# Patient Record
Sex: FEMALE | Race: WHITE
[De-identification: names, ages, dates, MRNs, and addresses within clinical notes are randomized per-mention and may not be internally consistent; named-entity substitution may affect disease eponyms.]

---

## 2020-12-09 ENCOUNTER — HOSPITAL ENCOUNTER (INPATIENT)
Dept: HOSPITAL 26 - MED | Age: 85
LOS: 3 days | Discharge: HOME | DRG: 689 | End: 2020-12-12
Attending: FAMILY MEDICINE | Admitting: FAMILY MEDICINE
Payer: COMMERCIAL

## 2020-12-09 VITALS
BODY MASS INDEX: 22.49 KG/M2 | HEIGHT: 65 IN | DIASTOLIC BLOOD PRESSURE: 70 MMHG | WEIGHT: 135 LBS | SYSTOLIC BLOOD PRESSURE: 102 MMHG

## 2020-12-09 DIAGNOSIS — Z88.2: ICD-10-CM

## 2020-12-09 DIAGNOSIS — E86.0: ICD-10-CM

## 2020-12-09 DIAGNOSIS — N18.30: ICD-10-CM

## 2020-12-09 DIAGNOSIS — Z88.0: ICD-10-CM

## 2020-12-09 DIAGNOSIS — E44.0: ICD-10-CM

## 2020-12-09 DIAGNOSIS — N17.0: ICD-10-CM

## 2020-12-09 DIAGNOSIS — G93.41: ICD-10-CM

## 2020-12-09 DIAGNOSIS — E87.2: ICD-10-CM

## 2020-12-09 DIAGNOSIS — Z91.040: ICD-10-CM

## 2020-12-09 DIAGNOSIS — E78.5: ICD-10-CM

## 2020-12-09 DIAGNOSIS — E87.0: ICD-10-CM

## 2020-12-09 DIAGNOSIS — M06.9: ICD-10-CM

## 2020-12-09 DIAGNOSIS — Z91.018: ICD-10-CM

## 2020-12-09 DIAGNOSIS — I12.9: ICD-10-CM

## 2020-12-09 DIAGNOSIS — N39.0: Primary | ICD-10-CM

## 2020-12-09 DIAGNOSIS — F03.90: ICD-10-CM

## 2020-12-09 LAB
ALBUMIN FLD-MCNC: 3 G/DL (ref 3.4–5)
AMORPH SED URNS QL MICRO: (no result) /HPF
ANION GAP SERPL CALCULATED.3IONS-SCNC: 16.7 MMOL/L (ref 8–16)
APPEARANCE UR: (no result)
AST SERPL-CCNC: 59 U/L (ref 15–37)
BASOPHILS # BLD AUTO: 0 K/UL (ref 0–0.22)
BASOPHILS NFR BLD AUTO: 0.6 % (ref 0–2)
BILIRUB SERPL-MCNC: 2.3 MG/DL (ref 0–1)
BILIRUB UR QL STRIP: (no result)
BUN SERPL-MCNC: 61 MG/DL (ref 7–18)
CHLORIDE SERPL-SCNC: 101 MMOL/L (ref 98–107)
CO2 SERPL-SCNC: 22.4 MMOL/L (ref 21–32)
COLOR UR: (no result)
CREAT SERPL-MCNC: 1.9 MG/DL (ref 0.6–1.3)
EOSINOPHIL # BLD AUTO: 0.4 K/UL (ref 0–0.4)
EOSINOPHIL NFR BLD AUTO: 5.2 % (ref 0–4)
ERYTHROCYTE [DISTWIDTH] IN BLOOD BY AUTOMATED COUNT: 13.7 % (ref 11.6–13.7)
FINE GRAN CASTS URNS QL MICRO: (no result) /LPF
GFR SERPL CREATININE-BSD FRML MDRD: (no result) ML/MIN (ref 90–?)
GLUCOSE SERPL-MCNC: 141 MG/DL (ref 74–106)
GLUCOSE UR STRIP-MCNC: NEGATIVE MG/DL
HCT VFR BLD AUTO: 44.7 % (ref 36–48)
HGB BLD-MCNC: 15.2 G/DL (ref 12–16)
HGB UR QL STRIP: (no result)
LEUKOCYTE ESTERASE UR QL STRIP: NEGATIVE
LIPASE SERPL-CCNC: 346 U/L (ref 73–393)
LYMPHOCYTES # BLD AUTO: 0.3 K/UL (ref 2.5–16.5)
LYMPHOCYTES NFR BLD AUTO: 4.6 % (ref 20.5–51.1)
MCH RBC QN AUTO: 34 PG (ref 27–31)
MCHC RBC AUTO-ENTMCNC: 34 G/DL (ref 33–37)
MCV RBC AUTO: 99.3 FL (ref 80–94)
MONOCYTES # BLD AUTO: 0.3 K/UL (ref 0.8–1)
MONOCYTES NFR BLD AUTO: 4.2 % (ref 1.7–9.3)
NEUTROPHILS # BLD AUTO: 6 K/UL (ref 1.8–7.7)
NEUTROPHILS NFR BLD AUTO: 85.4 % (ref 42.2–75.2)
NITRITE UR QL STRIP: NEGATIVE
PH UR STRIP: 6 [PH] (ref 5–9)
PLATELET # BLD AUTO: 88 K/UL (ref 140–450)
POTASSIUM SERPL-SCNC: 4.1 MMOL/L (ref 3.5–5.1)
PROTHROMBIN TIME: 10.1 SECS (ref 10.8–13.4)
RBC # BLD AUTO: 4.5 MIL/UL (ref 4.2–5.4)
RBC #/AREA URNS HPF: (no result) /HPF (ref 0–5)
SODIUM SERPL-SCNC: 136 MMOL/L (ref 136–145)
WBC # BLD AUTO: 7 K/UL (ref 4.8–10.8)
WBC,URINE: (no result) /HPF (ref 0–5)

## 2020-12-09 RX ADMIN — SODIUM CHLORIDE SCH MLS/HR: 9 INJECTION, SOLUTION INTRAVENOUS at 21:40

## 2020-12-09 NOTE — NUR
PT LAYING IN BED IN NO ACUTE DISTRESS NOTED IVF OF 0.9% NS RUNNING AT THIS 
TIME. BED LOCKED AND IN LOWEST POSITION. ON CARDIAC MONITORING, BP MONITORING 
AND PULSE OXIMETRY.

## 2020-12-09 NOTE — NUR
RECEIVED CALL FROM SAF IN LAB WITH CRITICAL RESULTS:

LACTIC ACID 2.1 AND BUN 61 DR STEWART NOTIFIED, NO NEW ORDERS RECEIVED

## 2020-12-10 VITALS — SYSTOLIC BLOOD PRESSURE: 107 MMHG | DIASTOLIC BLOOD PRESSURE: 66 MMHG

## 2020-12-10 VITALS — SYSTOLIC BLOOD PRESSURE: 148 MMHG | DIASTOLIC BLOOD PRESSURE: 80 MMHG

## 2020-12-10 VITALS — SYSTOLIC BLOOD PRESSURE: 148 MMHG | DIASTOLIC BLOOD PRESSURE: 79 MMHG

## 2020-12-10 VITALS — SYSTOLIC BLOOD PRESSURE: 153 MMHG | DIASTOLIC BLOOD PRESSURE: 64 MMHG

## 2020-12-10 VITALS — DIASTOLIC BLOOD PRESSURE: 70 MMHG | SYSTOLIC BLOOD PRESSURE: 115 MMHG

## 2020-12-10 VITALS — DIASTOLIC BLOOD PRESSURE: 73 MMHG | SYSTOLIC BLOOD PRESSURE: 145 MMHG

## 2020-12-10 LAB
AMYLASE SERPL-CCNC: 60 U/L (ref 25–115)
ANION GAP SERPL CALCULATED.3IONS-SCNC: 13.3 MMOL/L (ref 8–16)
BASOPHILS # BLD AUTO: 0 K/UL (ref 0–0.22)
BASOPHILS NFR BLD AUTO: 0.3 % (ref 0–2)
BUN SERPL-MCNC: 45 MG/DL (ref 7–18)
CHLORIDE SERPL-SCNC: 108 MMOL/L (ref 98–107)
CHOLEST/HDLC SERPL: 6.7 {RATIO} (ref 1–4.5)
CO2 SERPL-SCNC: 23.6 MMOL/L (ref 21–32)
CREAT SERPL-MCNC: 1.4 MG/DL (ref 0.6–1.3)
EOSINOPHIL # BLD AUTO: 0.5 K/UL (ref 0–0.4)
EOSINOPHIL NFR BLD AUTO: 7.8 % (ref 0–4)
ERYTHROCYTE [DISTWIDTH] IN BLOOD BY AUTOMATED COUNT: 13.9 % (ref 11.6–13.7)
GFR SERPL CREATININE-BSD FRML MDRD: (no result) ML/MIN (ref 90–?)
GLUCOSE SERPL-MCNC: 95 MG/DL (ref 74–106)
HCT VFR BLD AUTO: 40.2 % (ref 36–48)
HDLC SERPL-MCNC: 40 MG/DL (ref 40–60)
HGB BLD-MCNC: 13.7 G/DL (ref 12–16)
LDLC SERPL CALC-MCNC: 167 MG/DL (ref 60–100)
LIPASE SERPL-CCNC: 352 U/L (ref 73–393)
LYMPHOCYTES # BLD AUTO: 0.6 K/UL (ref 2.5–16.5)
LYMPHOCYTES NFR BLD AUTO: 8.4 % (ref 20.5–51.1)
MAGNESIUM SERPL-MCNC: 2.2 MG/DL (ref 1.8–2.4)
MCH RBC QN AUTO: 34 PG (ref 27–31)
MCHC RBC AUTO-ENTMCNC: 34 G/DL (ref 33–37)
MCV RBC AUTO: 100.1 FL (ref 80–94)
MONOCYTES # BLD AUTO: 0.4 K/UL (ref 0.8–1)
MONOCYTES NFR BLD AUTO: 6.6 % (ref 1.7–9.3)
NEUTROPHILS # BLD AUTO: 5.1 K/UL (ref 1.8–7.7)
NEUTROPHILS NFR BLD AUTO: 76.9 % (ref 42.2–75.2)
PHOSPHATE SERPL-MCNC: 3.3 MG/DL (ref 2.5–4.9)
PLATELET # BLD AUTO: 89 K/UL (ref 140–450)
POTASSIUM SERPL-SCNC: 3.9 MMOL/L (ref 3.5–5.1)
RBC # BLD AUTO: 4.02 MIL/UL (ref 4.2–5.4)
SODIUM SERPL-SCNC: 141 MMOL/L (ref 136–145)
T4 FREE SERPL-MCNC: 1.12 NG/DL (ref 0.76–1.46)
TRIGL SERPL-MCNC: 305 MG/DL (ref 30–150)
TSH SERPL DL<=0.05 MIU/L-ACNC: 2.88 UIU/ML (ref 0.34–3.74)
WBC # BLD AUTO: 6.6 K/UL (ref 4.8–10.8)

## 2020-12-10 RX ADMIN — CYCLOBENZAPRINE HYDROCHLORIDE SCH MG: 10 TABLET, FILM COATED ORAL at 20:27

## 2020-12-10 RX ADMIN — DONEPEZIL HYDROCHLORIDE SCH MG: 10 TABLET, FILM COATED ORAL at 20:27

## 2020-12-10 RX ADMIN — SODIUM CHLORIDE SCH MLS/HR: 9 INJECTION, SOLUTION INTRAVENOUS at 15:16

## 2020-12-10 RX ADMIN — PANTOPRAZOLE SODIUM SCH MG: 40 TABLET, DELAYED RELEASE ORAL at 08:54

## 2020-12-10 RX ADMIN — PREGABALIN SCH MG: 25 CAPSULE ORAL at 20:27

## 2020-12-10 NOTE — NUR
PT CURRENTLY RECEIVING HEMODIALYSIS. BS AT THIS TIME 129 NO COVERAGE NEEDED. HEPARIN 10,000 
GIVEN TO HEMODIALYSIS NURSE FOR ADMINISTRATION/FLUSH AFTER DIALYSES.  NO SEIZURE LIKE 
ACTIVITY  

-------------------------------------------------------------------------------

Addendum: 12/10/20 at 1257 by Yeny Bedolla RN

-------------------------------------------------------------------------------

WRONG PT DISREGARD PT IS RESTING IN BED IN NO DISTRESS. CALL LIGHT WITHIN REACH. ALL NEEDS 
MET.

## 2020-12-10 NOTE — NUR
PT LAYING IN BED WITH HOB SLIGHTLY ELEVATED IN NO DISTRESS. PT NEEDS REDIRECTION FROM 
REMOVING LEADS HOWEVER REDIRECTABLE. REMAINS ALTERED CAN COMMUNICATE WANTS AND NEEDS. 
CONTINUES TO RECEIVE IVF.

## 2020-12-10 NOTE — NUR
PATIENT HAS BEEN SCREENED AND CATEGORIZED AS HIGH NUTRITION RISK. PATIENT WILL BE SEEN 
WITHIN 1-2 DAYS OF ADMISSION.



12/10/20-12/11/20



NALINI CAVAZOS RD

## 2020-12-10 NOTE — NUR
RECEIVED BEDSIDE ENDORSEMENT FROM AM SHIFT RN. PT IS AWAKE, ALERT, LYING IN BED, FOWLERS 
POSITION, WATCHING TV, NO DISTRESS, DENIES PAIN, IVF INFUSING, FALL PROTOCOL IN PLACE, PLAN 
OF CARE DISCUSSED, CALL LIGHT WITHIN REACH.

## 2020-12-10 NOTE — NUR
PT IS AWAKE, WATCHING TV. HOB ELEVATED, DUE MEDS GIVEN AS ORDERED, TOLERATED WELL, CALL 
LIGHT WITHIN REACH.

## 2020-12-10 NOTE — NUR
PT ARRIVED TO UNIT FROM ED VIA GURNEY. RECEIVED REPORT PRIOR. PT AOX2-3, ABLE TO MAKE NEEDS 
KNOWN. CALM AND COOPERATIVE TO CARE. PT WITH MOMENTS OF CONFUSION. RESPIRATIONS ARE EVEN AND 
UNLABORED TO ROOM AIR. ABDOMEN IS SOFT AND NON-TENDER, ACTIVE BOWEL SOUNDS NOTED. SKIN IS 
WARM, DRY, AND INTACT. PT WITH IV ACCESS ON LEFT FA G22 PATENT AND INTACT, IVF INFUSING 
WELL. PT NOT IN DISTRESS, DENIES ANY PAIN OR DISCOMFORT. NO REQUESTS MADE. PT WELCOMED AND 
ORIENTED TO ROOM. VS TAKEN, STABLE. MRSA SWAB COLLECTED, PT HOOKED TO TELE MONITOR. PT KEPT 
COMFORTABLE. SAFETY MEASURES IN PLACE. CALL LIGHT WITHIN REACH. WILL CONTINUE TO MONITOR.

## 2020-12-10 NOTE — NUR
Patient will be admitted to care of Dr. Sinclair . Admited to Telemetry.  Will go to 
room 108A. Belongings list completed.  Report to Terrell SMITH.

## 2020-12-10 NOTE — NUR
ROUNDS MADE. PT IN BED RESTING WITH HOB ELEVATED. PT NOT IN DISTRESS. DENIES ANY PAIN OR 
DISCOMFORT. NO REQUESTS MADE. PT KEPT COMFORTABLE. SAFETY MEASURES IN PLACE. CALL LIGHT 
WITHIN REACH. WILL CONTINUE TO MONITOR.

## 2020-12-10 NOTE — NUR
PT IS AWAKE AND ORIENTED X 2 TO PERSON, PLACE. FRIENDLY RECEIVING IVF TO LEFT FOREARM. LUNG 
SOUNDS CLEAR ABD IS ROUND, SOFT AND NONTENDER ACTIVE BS X 4. TOLERATED PO MEDICATION. 
REMAINS ALTERED. PT WAS RE-ORIENTED, SAFETY MEASURES IN PLACE. WILL CONTINUE WITH POC.

## 2020-12-10 NOTE — NUR
PT IS RESTING IN BED, SOME MOMENTS OF HALLUCINATIONS VERBALIZING SHE SEES BLOOD ON THE 
FLOOR, REFERRING TO DISCOLORS ON THE WALL.  REPORTING SEEING FAMILY PRESENT. PT WAS 
RE-ORIENTED AND REASSURED FOR SAFETY. BED ALARM ACTIVATED SAFETY MEASURES IN PLACE.

## 2020-12-10 NOTE — NUR
VS STABLE. PT IN BED RESTING. DENIES ANY PAIN OR DISCOMFORT. NO S/SX OF DISTRESS NOTED. NO 
REQUESTS MADE. PT KEPT COMFORTABLE. WILL CONTINUE TO MONITOR.

## 2020-12-10 NOTE — NUR
12/10/20 RD INITIAL ASSESSMENT COMPLETED



PLEASE REFER TO NUTRITION ASSESSMENT UNDER CARE ACTIVITY FOR ESTIMATED NUTRITIONAL NEEDS. 



1. CONTINUE PUREE DIET AS TOLERATED 

2. RECOMMEND ENSURE TID

3. ENCOURAGE PO INTAKE ABOVE 50%

4. RD TO FOLLOW-UP 2-3 DAYS, HIGH RISK 



NALINI CAVAZOS, RD

## 2020-12-11 VITALS — SYSTOLIC BLOOD PRESSURE: 153 MMHG | DIASTOLIC BLOOD PRESSURE: 65 MMHG

## 2020-12-11 VITALS — SYSTOLIC BLOOD PRESSURE: 155 MMHG | DIASTOLIC BLOOD PRESSURE: 65 MMHG

## 2020-12-11 VITALS — DIASTOLIC BLOOD PRESSURE: 60 MMHG | SYSTOLIC BLOOD PRESSURE: 155 MMHG

## 2020-12-11 VITALS — SYSTOLIC BLOOD PRESSURE: 144 MMHG | DIASTOLIC BLOOD PRESSURE: 67 MMHG

## 2020-12-11 VITALS — SYSTOLIC BLOOD PRESSURE: 135 MMHG | DIASTOLIC BLOOD PRESSURE: 98 MMHG

## 2020-12-11 VITALS — DIASTOLIC BLOOD PRESSURE: 63 MMHG | SYSTOLIC BLOOD PRESSURE: 153 MMHG

## 2020-12-11 VITALS — SYSTOLIC BLOOD PRESSURE: 128 MMHG | DIASTOLIC BLOOD PRESSURE: 70 MMHG

## 2020-12-11 VITALS — SYSTOLIC BLOOD PRESSURE: 166 MMHG | DIASTOLIC BLOOD PRESSURE: 84 MMHG

## 2020-12-11 LAB
ANION GAP SERPL CALCULATED.3IONS-SCNC: 16 MMOL/L (ref 8–16)
BASOPHILS # BLD AUTO: 0.1 K/UL (ref 0–0.22)
BASOPHILS NFR BLD AUTO: 0.7 % (ref 0–2)
BUN SERPL-MCNC: 23 MG/DL (ref 7–18)
CHLORIDE SERPL-SCNC: 111 MMOL/L (ref 98–107)
CO2 SERPL-SCNC: 21.1 MMOL/L (ref 21–32)
CREAT SERPL-MCNC: 1.1 MG/DL (ref 0.6–1.3)
EOSINOPHIL # BLD AUTO: 0.5 K/UL (ref 0–0.4)
EOSINOPHIL NFR BLD AUTO: 6.4 % (ref 0–4)
ERYTHROCYTE [DISTWIDTH] IN BLOOD BY AUTOMATED COUNT: 13.4 % (ref 11.6–13.7)
GFR SERPL CREATININE-BSD FRML MDRD: (no result) ML/MIN (ref 90–?)
GLUCOSE SERPL-MCNC: 97 MG/DL (ref 74–106)
HCT VFR BLD AUTO: 39.3 % (ref 36–48)
HGB BLD-MCNC: 13.4 G/DL (ref 12–16)
LYMPHOCYTES # BLD AUTO: 1.5 K/UL (ref 2.5–16.5)
LYMPHOCYTES NFR BLD AUTO: 18.9 % (ref 20.5–51.1)
MCH RBC QN AUTO: 34 PG (ref 27–31)
MCHC RBC AUTO-ENTMCNC: 34 G/DL (ref 33–37)
MCV RBC AUTO: 100.7 FL (ref 80–94)
MONOCYTES # BLD AUTO: 0.9 K/UL (ref 0.8–1)
MONOCYTES NFR BLD AUTO: 11.2 % (ref 1.7–9.3)
NEUTROPHILS # BLD AUTO: 4.9 K/UL (ref 1.8–7.7)
NEUTROPHILS NFR BLD AUTO: 62.8 % (ref 42.2–75.2)
PLATELET # BLD AUTO: 91 K/UL (ref 140–450)
POTASSIUM SERPL-SCNC: 4.1 MMOL/L (ref 3.5–5.1)
RBC # BLD AUTO: 3.9 MIL/UL (ref 4.2–5.4)
SODIUM SERPL-SCNC: 144 MMOL/L (ref 136–145)
WBC # BLD AUTO: 7.8 K/UL (ref 4.8–10.8)

## 2020-12-11 RX ADMIN — PREGABALIN SCH MG: 25 CAPSULE ORAL at 22:14

## 2020-12-11 RX ADMIN — PANTOPRAZOLE SODIUM SCH MG: 40 TABLET, DELAYED RELEASE ORAL at 08:36

## 2020-12-11 RX ADMIN — OXYBUTYNIN CHLORIDE SCH MG: 5 TABLET ORAL at 08:36

## 2020-12-11 RX ADMIN — DONEPEZIL HYDROCHLORIDE SCH MG: 10 TABLET, FILM COATED ORAL at 22:13

## 2020-12-11 RX ADMIN — SODIUM CHLORIDE SCH MLS/HR: 9 INJECTION, SOLUTION INTRAVENOUS at 00:59

## 2020-12-11 RX ADMIN — CLOPIDOGREL BISULFATE SCH MG: 75 TABLET, FILM COATED ORAL at 08:37

## 2020-12-11 RX ADMIN — PREGABALIN SCH MG: 25 CAPSULE ORAL at 08:37

## 2020-12-11 RX ADMIN — CYCLOBENZAPRINE HYDROCHLORIDE SCH MG: 10 TABLET, FILM COATED ORAL at 22:13

## 2020-12-11 RX ADMIN — SODIUM CHLORIDE SCH MLS/HR: 9 INJECTION, SOLUTION INTRAVENOUS at 23:40

## 2020-12-11 RX ADMIN — FERROUS SULFATE TAB 325 MG (65 MG ELEMENTAL FE) SCH MG: 325 (65 FE) TAB at 08:36

## 2020-12-11 NOTE — NUR
DC PLANNER:

PATIENT WILL BE DISCHARGED TODAY BACK TO [facility] TODAY. FAXED CLINICALS WILL FOLLOW 
UP FOR A BED NUMBER. 

-------------------------------------------------------------------------------

Addendum: 12/11/20 at 1346 by Odessa Garcia CM

-------------------------------------------------------------------------------

JOHNNY QUEVEDO:

PATIENT IS FROM [facility]. PATIENT CAN RETURN TO [facility] RO[facility]. 
HOWEVER THEY CAN NOT PROVIDE TRANSPORTATION.

-------------------------------------------------------------------------------

Addendum: 12/11/20 at 1504 by Odessa Garcia CM

-------------------------------------------------------------------------------

JOHNNY QUEVEDO:

TRANSPORTATION HAS BEEN SET UP WITH [facility] [phone] FOR 7:00 PM. NOTIFIED RN TEGEGENE 



[facility] 

[phone]

[facility] ROOM [address]


-------------------------------------------------------------------------------

Addendum: 12/11/20 at 1506 by Odessa Garcia CM

-------------------------------------------------------------------------------

JOHNNY QUEVEDO:

TRIED TO CONTACT PATIENTS MIKI NICHOLSON [phone] THE PHONE NUMBER PROVIDED IS 
DISCONNECTED.

## 2020-12-11 NOTE — NUR
RECIEVED PT AWAKE , CONFUSED - W/ HX OF DEMENTIA , NID - O2 SAT WNL - RA , IV SITE INTACT 
AND PATENT  PER NURSE AMADOU PT IS FOR DISCHARGE BACK TO NURSING FACILITY - WAITING THE [facility] 
TRANSPORT TO  THE PT . SAFETY MEASURES IN PLACE - BED ALARM ON . PLAN OF CARE 
DISCUSSED BUT POOR UNDERSTANDING DUE TO MENTAL STATUS - ON TELE MONITOR . WILL CONT. TO 
MONITOR . DENIES ANY PAIN .

## 2020-12-11 NOTE — NUR
RECEIVED BEDSIDE ENDORSEMENT FROM AM SHIFT RN. PT IS IN BED, FOWLERS POSITION, NO DISTRESS, 
IVF INFUSING, WE'RE WAITING FOR TRANSPORT TO ARRIVE, D/C PAPERS PRINTED OUT AND SIGNED, AM 
NURSE ALREADY GAVE REPORT TO THE NURSE IN [facility]. PT IS GOING TO ROOM 209. FALL 
PROTOCOL IN PLACE, CALL LIGHT WITHIN REACH.

## 2020-12-11 NOTE — NUR
Talked to case  management regarding discharge planning. Case management said still working 
on discharge

-------------------------------------------------------------------------------

Addendum: 12/11/20 at 1424 by Agency Antonio SMITH RN

-------------------------------------------------------------------------------

1424: Followed up with DC manager; waiting for transport

## 2020-12-12 VITALS — SYSTOLIC BLOOD PRESSURE: 133 MMHG | DIASTOLIC BLOOD PRESSURE: 82 MMHG

## 2020-12-12 VITALS — DIASTOLIC BLOOD PRESSURE: 78 MMHG | SYSTOLIC BLOOD PRESSURE: 140 MMHG

## 2020-12-12 LAB
ANION GAP SERPL CALCULATED.3IONS-SCNC: 14.2 MMOL/L (ref 8–16)
BUN SERPL-MCNC: 18 MG/DL (ref 7–18)
CHLORIDE SERPL-SCNC: 111 MMOL/L (ref 98–107)
CO2 SERPL-SCNC: 24.4 MMOL/L (ref 21–32)
CREAT SERPL-MCNC: 0.9 MG/DL (ref 0.6–1.3)
EOSINOPHIL NFR BLD MANUAL: 4 % (ref 0–4)
ERYTHROCYTE [DISTWIDTH] IN BLOOD BY AUTOMATED COUNT: 13.7 % (ref 11.6–13.7)
GFR SERPL CREATININE-BSD FRML MDRD: (no result) ML/MIN (ref 90–?)
GLUCOSE SERPL-MCNC: 97 MG/DL (ref 74–106)
HCT VFR BLD AUTO: 36.4 % (ref 36–48)
HGB BLD-MCNC: 12.4 G/DL (ref 12–16)
LYMPHOCYTES NFR BLD MANUAL: 45 % (ref 20–46)
MCH RBC QN AUTO: 35 PG (ref 27–31)
MCHC RBC AUTO-ENTMCNC: 34 G/DL (ref 33–37)
MCV RBC AUTO: 100.7 FL (ref 80–94)
MONOCYTES NFR BLD MANUAL: 9 % (ref 5–12)
PLATELET # BLD AUTO: 112 K/UL (ref 140–450)
POTASSIUM SERPL-SCNC: 3.6 MMOL/L (ref 3.5–5.1)
RBC # BLD AUTO: 3.61 MIL/UL (ref 4.2–5.4)
SODIUM SERPL-SCNC: 146 MMOL/L (ref 136–145)
T3RU NFR SERPL: 30 % (ref 24–39)
T4 SERPL-MCNC: 5.6 UG/DL (ref 4.5–12)
WBC # BLD AUTO: 9.8 K/UL (ref 4.8–10.8)

## 2020-12-12 RX ADMIN — OXYBUTYNIN CHLORIDE SCH MG: 5 TABLET ORAL at 08:58

## 2020-12-12 RX ADMIN — FERROUS SULFATE TAB 325 MG (65 MG ELEMENTAL FE) SCH MG: 325 (65 FE) TAB at 08:57

## 2020-12-12 RX ADMIN — CLOPIDOGREL BISULFATE SCH MG: 75 TABLET, FILM COATED ORAL at 08:58

## 2020-12-12 RX ADMIN — PREGABALIN SCH MG: 25 CAPSULE ORAL at 08:58

## 2020-12-12 RX ADMIN — PANTOPRAZOLE SODIUM SCH MG: 40 TABLET, DELAYED RELEASE ORAL at 08:57

## 2020-12-12 NOTE — NUR
RECEIVED ENDORSEMENT FROM NIGHT SHIFT, AWAKE,ALERT AND ORIENNTEDX1, BREATHING SPONTANEOUSLY 
AT ROOM AIR, NON LABORED NOTED. WITH IV CANNULA G22 AT LEFT HAND ON SALINE LOCK. SAFETY 
MEASURES IN PLACE AND CONTINUE MONITOR. FOR DISCHARGE TO [facility], AWAITING TRANSPORT 
PERSONNEL.

## 2020-12-12 NOTE — NUR
FUAD MEADE CONTACTED THAT THE  WILL  THE PATIENT, NO AVAILABLE TRANSPORT 
FOR NOW AS PER THE HOUSE SUPERVISOR KEO.

## 2020-12-12 NOTE — NUR
IV CANNULA REMOVED AND DRESSING APPLIED, NO BLEEDING NOTED. PREPARED FOR DISCHARGE, DIAPER 
PLACED AND CLOTHES CHANGED.

## 2020-12-12 NOTE — NUR
ENDORSED TO RELYN  PT IS FOR DISCHARGE BUT  THE  OF [facility]SP[facility] CALL OFF SO THAT THE 
PT STAYING HERE FOR WHOLE NIGHT - I TRIED TO CALL [employer] TO UPDATE THEM BUT NO ANSWER - NO 
ANSWERING MACHINE TOO - ENDORSE TO [place] PT HAS CONT. ANTIBIOTIC AND SHE HAVE TO UPDATE THE 
FACILITY . 

-------------------------------------------------------------------------------

Addendum: 12/12/20 at 0805 by Margaret Espinal RN

-------------------------------------------------------------------------------

ENDORSED - PT - STABLE

## 2020-12-12 NOTE — NUR
DISCHARGED IN STABLE CONDITION, BREATHING SPONTANEOUSLY AT ROOM AIR PER WHEELCHAIR 
ACCOMPANIED BY  UNIT SECRETARY WITH DISCHARGE PACKET INSTRUCTION [facility].